# Patient Record
Sex: FEMALE | Race: WHITE | NOT HISPANIC OR LATINO
[De-identification: names, ages, dates, MRNs, and addresses within clinical notes are randomized per-mention and may not be internally consistent; named-entity substitution may affect disease eponyms.]

---

## 2020-02-11 PROBLEM — Z00.00 ENCOUNTER FOR PREVENTIVE HEALTH EXAMINATION: Status: ACTIVE | Noted: 2020-02-11

## 2020-02-20 ENCOUNTER — APPOINTMENT (OUTPATIENT)
Dept: ENDOCRINOLOGY | Facility: CLINIC | Age: 40
End: 2020-02-20
Payer: COMMERCIAL

## 2020-02-20 VITALS
SYSTOLIC BLOOD PRESSURE: 122 MMHG | HEART RATE: 77 BPM | HEIGHT: 65 IN | BODY MASS INDEX: 30.99 KG/M2 | DIASTOLIC BLOOD PRESSURE: 74 MMHG | WEIGHT: 186 LBS

## 2020-02-20 DIAGNOSIS — Z86.59 PERSONAL HISTORY OF OTHER MENTAL AND BEHAVIORAL DISORDERS: ICD-10-CM

## 2020-02-20 DIAGNOSIS — Z78.9 OTHER SPECIFIED HEALTH STATUS: ICD-10-CM

## 2020-02-20 DIAGNOSIS — Z60.2 PROBLEMS RELATED TO LIVING ALONE: ICD-10-CM

## 2020-02-20 DIAGNOSIS — Z85.828 PERSONAL HISTORY OF OTHER MALIGNANT NEOPLASM OF SKIN: ICD-10-CM

## 2020-02-20 DIAGNOSIS — Z87.891 PERSONAL HISTORY OF NICOTINE DEPENDENCE: ICD-10-CM

## 2020-02-20 PROCEDURE — 99204 OFFICE O/P NEW MOD 45 MIN: CPT

## 2020-02-20 PROCEDURE — 99024 POSTOP FOLLOW-UP VISIT: CPT

## 2020-02-20 SDOH — SOCIAL STABILITY - SOCIAL INSECURITY: PROBLEMS RELATED TO LIVING ALONE: Z60.2

## 2020-02-20 NOTE — PHYSICAL EXAM
[Alert] : alert [No Acute Distress] : no acute distress [Well Nourished] : well nourished [Well Developed] : well developed [Normal Sclera/Conjunctiva] : normal sclera/conjunctiva [EOMI] : extra ocular movement intact [No LAD] : no lymphadenopathy [Thyroid Not Enlarged] : the thyroid was not enlarged [No Thyroid Nodules] : there were no palpable thyroid nodules [Normal Rate and Effort] : normal respiratory rhythm and effort [Clear to Auscultation] : lungs were clear to auscultation bilaterally [Normal Rate] : heart rate was normal  [Normal S1, S2] : normal S1 and S2 [Cranial Nerves Intact] : cranial nerves 2-12 were intact [Normal Reflexes] : deep tendon reflexes were 2+ and symmetric [No Tremors] : no tremors [Oriented x3] : oriented to person, place, and time [Normal Insight/Judgement] : insight and judgment were intact

## 2020-02-20 NOTE — ASSESSMENT
[FreeTextEntry1] : 40 year old female with \par 1. Bilateral thyroid nodule - with Left 7 mm nodule with irregular borders which meets criteria for FNA biopsy\par - discussed risks of thyroid nodule\par - advised FNA Left 7 mm nodule\par \par 2. Midlly elevated TSH on labs 1/2020 with vague symptoms - minimal improvement since starting low dose thyroid hormone. She does not have autoimmune  thyroid disease\par - no indication to continue thyroid hormone at this time\par - stop low dose LT4 and repeat TFTs 6 weeks\par - discussed signs/sx and diagnosis of hypothyroidism\par \par 3. IFG on labs - repeat Fasting glucose and A1c level

## 2020-02-20 NOTE — DATA REVIEWED
[FreeTextEntry1] : Labs 1/29/20 neg TPO and Tg Ab\par Labs 1/3/20 Gluc 106, TSH 5.46, FT4 1.11\par \par Thyroid sono 1/30/20\par RLP nodule 3x2x3 mm ?cystic nodule\par LMP nodule 7x5x6 mm - hypoechoic, irregular borders

## 2020-02-20 NOTE — HISTORY OF PRESENT ILLNESS
[FreeTextEntry1] : Quality: abnormal TSH and thyroid nodules\par Duration: 2020\par Onset: routine blood test showed TSH mild elevation\par Severity: mild\par Associated symptoms: denies anterior neck pain, dysphagia or voice changes, (+) mood swings, depression and anxiety\par \par MODIFYING FACTORS: \par Medication history - no prior thyroid meds\par Current thyroid medication:\par Levothyroxine 50 mcg daily - started 3 weeks ago, adherent with dosing and admin\par \par \par \par \par

## 2020-02-20 NOTE — CONSULT LETTER
[Dear  ___] : Dear  [unfilled], [Consult Letter:] : I had the pleasure of evaluating your patient, [unfilled]. [Please see my note below.] : Please see my note below. [Consult Closing:] : Thank you very much for allowing me to participate in the care of this patient.  If you have any questions, please do not hesitate to contact me. [Sincerely,] : Sincerely, [FreeTextEntry3] : Jordyn Hughes, \par

## 2020-02-20 NOTE — REVIEW OF SYSTEMS
[Fatigue] : fatigue [Recent Weight Gain (___ Lbs)] : recent [unfilled] ~Ulb weight gain [As Noted in HPI] : as noted in HPI [Dry Skin] : dry skin [Cough] : cough [Anxiety] : anxiety [Depression] : depression [Negative] : Neurological [Shortness Of Breath] : no shortness of breath [SOB on Exertion] : no shortness of breath during exertion

## 2020-04-08 ENCOUNTER — APPOINTMENT (OUTPATIENT)
Dept: ENDOCRINOLOGY | Facility: CLINIC | Age: 40
End: 2020-04-08

## 2020-06-25 ENCOUNTER — APPOINTMENT (OUTPATIENT)
Dept: ENDOCRINOLOGY | Facility: CLINIC | Age: 40
End: 2020-06-25
Payer: COMMERCIAL

## 2020-06-25 DIAGNOSIS — R73.01 IMPAIRED FASTING GLUCOSE: ICD-10-CM

## 2020-06-25 DIAGNOSIS — R94.6 ABNORMAL RESULTS OF THYROID FUNCTION STUDIES: ICD-10-CM

## 2020-06-25 DIAGNOSIS — E04.2 NONTOXIC MULTINODULAR GOITER: ICD-10-CM

## 2020-06-25 PROCEDURE — 99213 OFFICE O/P EST LOW 20 MIN: CPT | Mod: 95

## 2020-06-25 RX ORDER — CLONAZEPAM 0.5 MG/1
0.5 TABLET ORAL
Refills: 0 | Status: DISCONTINUED | COMMUNITY
End: 2020-06-25

## 2020-06-25 RX ORDER — LEVOTHYROXINE SODIUM 0.05 MG/1
50 TABLET ORAL DAILY
Refills: 0 | Status: DISCONTINUED | COMMUNITY
End: 2020-06-25

## 2020-06-25 RX ORDER — SERTRALINE HYDROCHLORIDE 100 MG/1
100 TABLET, FILM COATED ORAL DAILY
Refills: 0 | Status: ACTIVE | COMMUNITY

## 2020-06-26 NOTE — REASON FOR VISIT
[Follow - Up] : a follow-up visit [Hypothyroidism] : hypothyroidism [Thyroid nodule/ MNG] : thyroid nodule/ MNG [Home] : at home, [unfilled] , at the time of the visit. [Medical Office: ([facility])___] : at the medical office located in  [Verbal consent obtained from patient] : the patient, [unfilled]

## 2020-06-26 NOTE — DATA REVIEWED
[FreeTextEntry1] : Labs 1/29/20 neg TPO and Tg Ab\par Labs 1/3/20 Gluc 106, TSH 5.46, FT4 1.11\par \par Thyroid sono 1/30/20\par RLP nodule 3x2x3 mm ?cystic nodule\par LMP nodule 7x5x6 mm - hypoechoic, irregular borders\par \par Thyroid FNA biopsy 3/6/20\par Left MP nodule - benign follicular nodule, cat 2

## 2020-06-26 NOTE — HISTORY OF PRESENT ILLNESS
[FreeTextEntry1] : Interval Hx: no changes, elevated stress and altered mood levels since pandemic, has been talking w therapist\par \par Quality: abnormal TSH and thyroid nodules\par Duration: 2020\par Onset: routine blood test showed TSH mild elevation\par Severity: mild\par Associated symptoms: denies anterior neck pain, dysphagia or voice changes\par \par MODIFYING FACTORS:  Current thyroid medication: off Lt4 since last visit\par \par \par \par \par \par

## 2020-06-26 NOTE — REVIEW OF SYSTEMS
[As Noted in HPI] : as noted in HPI [Chest Pain] : no chest pain [Recent Weight Loss (___ Lbs)] : no recent weight loss [Recent Weight Gain (___ Lbs)] : no recent weight gain [Shortness Of Breath] : no shortness of breath

## 2020-06-26 NOTE — ASSESSMENT
[FreeTextEntry1] : 40 year old female with \par 1. Bilateral thyroid nodule - with Left 7 mm nodule with irregular borders - benign FNA 3/2020\par - monitor nodules with yearly sono, due 3/2020\par \par 2. Midlly elevated TSH on labs 1/2020 with vague symptoms - She does not have autoimmune  thyroid disease\par - no indication to resume  thyroid hormone at this time\par \par 3. IFG on prior labs - repeat Fasting glucose and A1c level are normal.

## 2020-06-26 NOTE — PHYSICAL EXAM
[Alert] : alert [EOMI] : extra ocular movement intact [No Acute Distress] : no acute distress [Well Nourished] : well nourished [No Respiratory Distress] : no respiratory distress [Oriented x3] : oriented to person, place, and time [No Accessory Muscle Use] : no accessory muscle use [Normal Mood] : the mood was normal [Normal Affect] : the affect was normal [Normal Insight/Judgement] : insight and judgment were intact [de-identified] : No visible thyroid enlargement

## 2020-08-18 ENCOUNTER — APPOINTMENT (OUTPATIENT)
Dept: ENDOCRINOLOGY | Facility: CLINIC | Age: 40
End: 2020-08-18

## 2022-09-16 ENCOUNTER — APPOINTMENT (OUTPATIENT)
Dept: HUMAN REPRODUCTION | Facility: CLINIC | Age: 42
End: 2022-09-16

## 2023-04-28 ENCOUNTER — OFFICE (OUTPATIENT)
Dept: URBAN - METROPOLITAN AREA CLINIC 100 | Facility: CLINIC | Age: 43
Setting detail: OPHTHALMOLOGY
End: 2023-04-28
Payer: COMMERCIAL

## 2023-04-28 VITALS — HEIGHT: 55 IN

## 2023-04-28 DIAGNOSIS — H50.011: ICD-10-CM

## 2023-04-28 DIAGNOSIS — H52.13: ICD-10-CM

## 2023-04-28 PROCEDURE — 92060 SENSORIMOTOR EXAMINATION: CPT | Performed by: OPHTHALMOLOGY

## 2023-04-28 PROCEDURE — 99214 OFFICE O/P EST MOD 30 MIN: CPT | Performed by: OPHTHALMOLOGY

## 2023-04-28 PROCEDURE — 92015 DETERMINE REFRACTIVE STATE: CPT | Performed by: OPHTHALMOLOGY

## 2023-04-28 ASSESSMENT — SPHEQUIV_DERIVED
OD_SPHEQUIV: -2.625
OS_SPHEQUIV: -2
OS_SPHEQUIV: -2.125
OS_SPHEQUIV: -2
OD_SPHEQUIV: -2.125
OD_SPHEQUIV: -2.125
OS_SPHEQUIV: -1.75
OD_SPHEQUIV: -2

## 2023-04-28 ASSESSMENT — CONFRONTATIONAL VISUAL FIELD TEST (CVF)
OS_FINDINGS: FULL
OD_FINDINGS: FULL

## 2023-04-28 ASSESSMENT — AXIALLENGTH_DERIVED
OS_AL: 23.9579
OD_AL: 24.266
OD_AL: 24.0617
OS_AL: 24.0588
OD_AL: 24.0111
OS_AL: 24.0588
OS_AL: 24.1095
OD_AL: 24.0617

## 2023-04-28 ASSESSMENT — REFRACTION_CURRENTRX
OS_VPRISM_DIRECTION: SV
OD_AXIS: 095
OD_OVR_VA: 20/
OS_SPHERE: -1.50
OS_AXIS: 097
OS_OVR_VA: 20/
OS_CYLINDER: -0.50
OD_VPRISM_DIRECTION: SV
OD_CYLINDER: -0.50
OD_SPHERE: -1.75

## 2023-04-28 ASSESSMENT — REFRACTION_MANIFEST
OS_SPHERE: -1.75
OD_VA1: 20/20-1
OD_AXIS: 170
OS_VA1: 20/20
OD_SPHERE: -1.75
OS_AXIS: 90
OD_CYLINDER: -0.75
OD_SPHERE: -2.75
OS_VA1: 20/20
OD_CYLINDER: +0.25
OS_CYLINDER: -0.50
OD_AXIS: 100
OS_SPHERE: -2.25
OS_CYLINDER: +0.25
OS_AXIS: 140
OD_VA1: 20/20-1

## 2023-04-28 ASSESSMENT — REFRACTION_AUTOREFRACTION
OD_SPHERE: -1.75
OD_CYLINDER: -0.75
OD_CYLINDER: +0.50
OS_CYLINDER: -0.50
OS_SPHERE: -1.75
OS_SPHERE: -2.00
OS_AXIS: 090
OD_SPHERE: -2.25
OS_AXIS: 155
OD_AXIS: 100
OD_AXIS: 180
OS_CYLINDER: +0.50

## 2023-04-28 ASSESSMENT — VISUAL ACUITY
OD_BCVA: 20/25-1
OS_BCVA: 20/20-2

## 2023-04-28 ASSESSMENT — KERATOMETRY
OD_AXISANGLE_DEGREES: 090
OD_K1POWER_DIOPTERS: 44.50
OS_AXISANGLE_DEGREES: 105
OD_K2POWER_DIOPTERS: 44.50
OS_K2POWER_DIOPTERS: 44.50
OS_K1POWER_DIOPTERS: 44.25

## 2023-04-28 ASSESSMENT — TONOMETRY
OS_IOP_MMHG: 18
OD_IOP_MMHG: 17

## 2023-06-09 ENCOUNTER — OFFICE (OUTPATIENT)
Dept: URBAN - METROPOLITAN AREA CLINIC 100 | Facility: CLINIC | Age: 43
Setting detail: OPHTHALMOLOGY
End: 2023-06-09
Payer: COMMERCIAL

## 2023-06-09 DIAGNOSIS — H50.011: ICD-10-CM

## 2023-06-09 PROCEDURE — 99214 OFFICE O/P EST MOD 30 MIN: CPT | Performed by: OPHTHALMOLOGY

## 2023-06-09 PROCEDURE — 92060 SENSORIMOTOR EXAMINATION: CPT | Performed by: OPHTHALMOLOGY

## 2023-06-09 ASSESSMENT — SPHEQUIV_DERIVED
OS_SPHEQUIV: -2.125
OS_SPHEQUIV: -2
OD_SPHEQUIV: -2.625
OD_SPHEQUIV: -2.25
OS_SPHEQUIV: -2.125
OD_SPHEQUIV: -2.125
OD_SPHEQUIV: -2
OS_SPHEQUIV: -1.75

## 2023-06-09 ASSESSMENT — CONFRONTATIONAL VISUAL FIELD TEST (CVF)
OS_FINDINGS: FULL
OD_FINDINGS: FULL

## 2023-06-09 ASSESSMENT — REFRACTION_CURRENTRX
OD_SPHERE: -1.75
OD_VPRISM_DIRECTION: SV
OS_CYLINDER: -0.50
OS_VPRISM_DIRECTION: SV
OD_AXIS: 088
OS_OVR_VA: 20/
OS_SPHERE: -1.50
OD_CYLINDER: -0.50
OD_OVR_VA: 20/
OS_AXIS: 089

## 2023-06-09 ASSESSMENT — REFRACTION_MANIFEST
OD_SPHERE: -1.75
OD_CYLINDER: +0.25
OS_AXIS: 140
OS_CYLINDER: -0.50
OS_SPHERE: -2.25
OS_AXIS: 90
OS_CYLINDER: +0.25
OD_CYLINDER: -0.75
OD_AXIS: 100
OD_VA1: 20/20-1
OD_AXIS: 170
OS_SPHERE: -1.75
OD_SPHERE: -2.75
OS_VA1: 20/20
OD_VA1: 20/20-1
OS_VA1: 20/20

## 2023-06-09 ASSESSMENT — REFRACTION_AUTOREFRACTION
OS_CYLINDER: +0.50
OD_CYLINDER: -0.50
OD_SPHERE: -2.00
OS_AXIS: 100
OD_CYLINDER: +0.50
OS_SPHERE: -2.00
OD_SPHERE: -2.25
OD_AXIS: 180
OS_CYLINDER: -0.25
OS_AXIS: 155
OD_AXIS: 092
OS_SPHERE: -2.00

## 2023-06-09 ASSESSMENT — VISUAL ACUITY
OD_BCVA: 20/20-3
OS_BCVA: 20/20-2

## 2023-06-09 ASSESSMENT — AXIALLENGTH_DERIVED
OD_AL: 23.9164
OS_AL: 24.0111
OD_AL: 23.9665
OS_AL: 24.0617
OS_AL: 23.9107
OS_AL: 24.0617
OD_AL: 24.1693
OD_AL: 24.0169

## 2023-06-09 ASSESSMENT — TONOMETRY
OS_IOP_MMHG: 17
OD_IOP_MMHG: 18

## 2023-06-09 ASSESSMENT — KERATOMETRY
OS_K1POWER_DIOPTERS: 44.25
OS_K2POWER_DIOPTERS: 44.75
OD_AXISANGLE_DEGREES: 090
OD_K2POWER_DIOPTERS: 44.75
OD_K1POWER_DIOPTERS: 44.75
OS_AXISANGLE_DEGREES: 090

## 2023-08-01 ENCOUNTER — OFFICE (OUTPATIENT)
Dept: URBAN - METROPOLITAN AREA CLINIC 100 | Facility: CLINIC | Age: 43
Setting detail: OPHTHALMOLOGY
End: 2023-08-01
Payer: COMMERCIAL

## 2023-08-01 DIAGNOSIS — H50.011: ICD-10-CM

## 2023-08-01 PROCEDURE — 92060 SENSORIMOTOR EXAMINATION: CPT | Performed by: OPHTHALMOLOGY

## 2023-08-01 PROCEDURE — 99214 OFFICE O/P EST MOD 30 MIN: CPT | Performed by: OPHTHALMOLOGY

## 2023-08-01 ASSESSMENT — REFRACTION_AUTOREFRACTION
OD_AXIS: 180
OS_CYLINDER: +0.50
OD_CYLINDER: +0.50
OS_CYLINDER: -0.25
OS_AXIS: 155
OD_AXIS: 096
OD_CYLINDER: -0.75
OS_SPHERE: -2.00
OD_SPHERE: -2.00
OD_SPHERE: -2.25
OS_AXIS: 106
OS_SPHERE: -2.25

## 2023-08-01 ASSESSMENT — REFRACTION_MANIFEST
OS_SPHERE: -2.00
OS_VA1: 20/20-1
OD_SPHERE: -1.75
OU_VA: 20/20
OS_CYLINDER: SPHERE
OD_VA1: 20/20
OD_CYLINDER: -0.75
OD_AXIS: 100

## 2023-08-01 ASSESSMENT — REFRACTION_CURRENTRX
OD_SPHERE: -1.75
OD_OVR_VA: 20/
OS_CYLINDER: -0.50
OD_AXIS: 087
OD_CYLINDER: -0.50
OS_VPRISM_DIRECTION: SV
OS_AXIS: 089
OS_OVR_VA: 20/
OD_VPRISM_DIRECTION: SV
OS_SPHERE: -1.50

## 2023-08-01 ASSESSMENT — AXIALLENGTH_DERIVED
OD_AL: 23.9164
OD_AL: 24.0675
OD_AL: 23.9665
OS_AL: 24.1634
OS_AL: 23.9107

## 2023-08-01 ASSESSMENT — KERATOMETRY
OS_K1POWER_DIOPTERS: 44.25
OD_AXISANGLE_DEGREES: 090
OS_K2POWER_DIOPTERS: 44.75
OS_AXISANGLE_DEGREES: 086
OD_K1POWER_DIOPTERS: 44.75
OD_K2POWER_DIOPTERS: 44.75

## 2023-08-01 ASSESSMENT — CONFRONTATIONAL VISUAL FIELD TEST (CVF)
OS_FINDINGS: FULL
OD_FINDINGS: FULL

## 2023-08-01 ASSESSMENT — SPHEQUIV_DERIVED
OS_SPHEQUIV: -2.375
OS_SPHEQUIV: -1.75
OD_SPHEQUIV: -2
OD_SPHEQUIV: -2.375
OD_SPHEQUIV: -2.125

## 2023-08-01 ASSESSMENT — VISUAL ACUITY
OD_BCVA: 20/30
OS_BCVA: 20/20

## 2023-09-07 ENCOUNTER — OFFICE (OUTPATIENT)
Dept: URBAN - METROPOLITAN AREA CLINIC 6 | Facility: CLINIC | Age: 43
Setting detail: OPHTHALMOLOGY
End: 2023-09-07
Payer: COMMERCIAL

## 2023-09-07 DIAGNOSIS — H50.011: ICD-10-CM

## 2023-09-07 PROCEDURE — 99214 OFFICE O/P EST MOD 30 MIN: CPT | Performed by: OPHTHALMOLOGY

## 2023-09-07 PROCEDURE — 92060 SENSORIMOTOR EXAMINATION: CPT | Performed by: OPHTHALMOLOGY

## 2023-09-07 ASSESSMENT — KERATOMETRY
OS_K1POWER_DIOPTERS: 44.25
OD_AXISANGLE_DEGREES: 144
OD_K2POWER_DIOPTERS: 44.75
OS_AXISANGLE_DEGREES: 089
OD_K1POWER_DIOPTERS: 44.50
OS_K2POWER_DIOPTERS: 44.50

## 2023-09-07 ASSESSMENT — REFRACTION_MANIFEST
OD_VA1: 20/20
OD_AXIS: 100
OD_SPHERE: -1.75
OS_CYLINDER: SPHERE
OU_VA: 20/20
OD_CYLINDER: -0.75
OS_SPHERE: -2.00
OS_VA1: 20/20-1

## 2023-09-07 ASSESSMENT — REFRACTION_CURRENTRX
OS_CYLINDER: -0.50
OS_SPHERE: -1.50
OS_AXIS: 089
OD_OVR_VA: 20/
OD_VPRISM_DIRECTION: SV
OS_OVR_VA: 20/
OD_AXIS: 087
OD_SPHERE: -1.75
OD_CYLINDER: -0.50
OS_VPRISM_DIRECTION: SV

## 2023-09-07 ASSESSMENT — REFRACTION_AUTOREFRACTION
OS_SPHERE: -2.00
OD_AXIS: 180
OS_AXIS: 155
OD_AXIS: 090
OS_CYLINDER: +0.50
OD_SPHERE: -2.00
OD_SPHERE: -2.25
OS_CYLINDER: -0.50
OD_CYLINDER: +0.50
OD_CYLINDER: -0.75
OS_AXIS: 084
OS_SPHERE: -2.00

## 2023-09-07 ASSESSMENT — SPHEQUIV_DERIVED
OS_SPHEQUIV: -2.25
OD_SPHEQUIV: -2
OD_SPHEQUIV: -2.125
OD_SPHEQUIV: -2.375
OS_SPHEQUIV: -1.75

## 2023-09-07 ASSESSMENT — AXIALLENGTH_DERIVED
OS_AL: 24.1605
OD_AL: 24.1154
OS_AL: 23.9579
OD_AL: 24.014
OD_AL: 23.9637

## 2023-09-07 ASSESSMENT — TONOMETRY
OS_IOP_MMHG: 14
OD_IOP_MMHG: 15

## 2023-09-07 ASSESSMENT — CONFRONTATIONAL VISUAL FIELD TEST (CVF)
OS_FINDINGS: FULL
OD_FINDINGS: FULL

## 2023-09-07 ASSESSMENT — VISUAL ACUITY
OS_BCVA: 20/20
OD_BCVA: 20/20

## 2024-03-07 ENCOUNTER — OFFICE (OUTPATIENT)
Dept: URBAN - METROPOLITAN AREA CLINIC 6 | Facility: CLINIC | Age: 44
Setting detail: OPHTHALMOLOGY
End: 2024-03-07
Payer: COMMERCIAL

## 2024-03-07 DIAGNOSIS — H50.011: ICD-10-CM

## 2024-03-07 PROCEDURE — 92060 SENSORIMOTOR EXAMINATION: CPT | Performed by: OPHTHALMOLOGY

## 2024-03-07 PROCEDURE — 99214 OFFICE O/P EST MOD 30 MIN: CPT | Performed by: OPHTHALMOLOGY

## 2024-03-07 ASSESSMENT — REFRACTION_CURRENTRX
OS_SPHERE: -1.50
OD_AXIS: 087
OS_AXIS: 089
OD_OVR_VA: 20/
OS_OVR_VA: 20/
OD_CYLINDER: -0.50
OS_VPRISM_DIRECTION: SV
OD_VPRISM_DIRECTION: SV
OD_SPHERE: -1.75
OS_CYLINDER: -0.50

## 2024-03-07 ASSESSMENT — REFRACTION_MANIFEST
OS_SPHERE: -2.00
OU_VA: 20/20
OD_SPHERE: -1.75
OD_VA1: 20/20
OD_CYLINDER: -0.75
OS_VA1: 20/20-1
OS_CYLINDER: SPHERE
OD_AXIS: 100

## 2024-03-07 ASSESSMENT — SPHEQUIV_DERIVED: OD_SPHEQUIV: -2.125

## 2024-03-14 ENCOUNTER — TRANSCRIPTION ENCOUNTER (OUTPATIENT)
Age: 44
End: 2024-03-14

## 2024-03-15 ENCOUNTER — TRANSCRIPTION ENCOUNTER (OUTPATIENT)
Age: 44
End: 2024-03-15

## 2024-03-15 ENCOUNTER — OUTPATIENT HOSPITAL (OUTPATIENT)
Dept: URBAN - METROPOLITAN AREA CLINIC 33 | Facility: CLINIC | Age: 44
Setting detail: OPHTHALMOLOGY
End: 2024-03-15
Payer: COMMERCIAL

## 2024-03-15 ENCOUNTER — OUTPATIENT (OUTPATIENT)
Dept: OUTPATIENT SERVICES | Facility: HOSPITAL | Age: 44
LOS: 1 days | End: 2024-03-15
Payer: COMMERCIAL

## 2024-03-15 VITALS
SYSTOLIC BLOOD PRESSURE: 106 MMHG | RESPIRATION RATE: 12 BRPM | WEIGHT: 208.12 LBS | HEIGHT: 65 IN | OXYGEN SATURATION: 96 % | TEMPERATURE: 98 F | HEART RATE: 73 BPM | DIASTOLIC BLOOD PRESSURE: 68 MMHG

## 2024-03-15 VITALS
HEART RATE: 89 BPM | SYSTOLIC BLOOD PRESSURE: 125 MMHG | DIASTOLIC BLOOD PRESSURE: 78 MMHG | OXYGEN SATURATION: 99 % | RESPIRATION RATE: 12 BRPM

## 2024-03-15 DIAGNOSIS — H50.011 MONOCULAR ESOTROPIA, RIGHT EYE: ICD-10-CM

## 2024-03-15 DIAGNOSIS — Z98.890 OTHER SPECIFIED POSTPROCEDURAL STATES: Chronic | ICD-10-CM

## 2024-03-15 DIAGNOSIS — H50.011: ICD-10-CM

## 2024-03-15 LAB — HCG UR QL: NEGATIVE — SIGNIFICANT CHANGE UP

## 2024-03-15 PROCEDURE — 81025 URINE PREGNANCY TEST: CPT

## 2024-03-15 PROCEDURE — ZZZZZ: CPT

## 2024-03-15 PROCEDURE — 67311 REVISE EYE MUSCLE: CPT | Mod: 50

## 2024-03-15 PROCEDURE — 67311 REVISE EYE MUSCLE: CPT | Mod: 50 | Performed by: OPHTHALMOLOGY

## 2024-03-15 RX ORDER — SERTRALINE 25 MG/1
1 TABLET, FILM COATED ORAL
Refills: 0 | DISCHARGE

## 2024-03-15 RX ORDER — TRAZODONE HCL 50 MG
0 TABLET ORAL
Refills: 0 | DISCHARGE

## 2024-03-15 NOTE — ASU DISCHARGE PLAN (ADULT/PEDIATRIC) - NS MD DC FALL RISK RISK
For information on Fall & Injury Prevention, visit: [email or web address] OR  [email or web address] OR  https://www.cdc.gov/steadi/patient.html

## 2024-03-15 NOTE — ASU DISCHARGE PLAN (ADULT/PEDIATRIC) - CARE PROVIDER_API CALL
Ly Casarez  Ophthalmology  [address] [place] [address], [address]  [place] [address]  Phone: [phone]  Fax: [phone]  Scheduled Appointment: 03/16/2024 09:30 AM

## 2024-03-15 NOTE — ASU PATIENT PROFILE, ADULT - NSICDXPASTSURGICALHX_GEN_ALL_CORE_FT
PAST SURGICAL HISTORY:  H/O dilation and curettage x2    S/P Mohs surgery for basal cell carcinoma

## 2024-03-15 NOTE — ASU PATIENT PROFILE, ADULT - FALL HARM RISK - PT AGE POPULATION HIDDEN
Patient's preferred pharmacy has been set up and verified.  Per patient there were no changes to dose, sig, or quantity.  
Pt trans to appt desk.  
Refill request for zolpidem.  Last seen 4/23/18 to establish care;  Last filled 2/12/19.    PDMP reviewed and shows med was last dispensed on 2/12/19. No suspicious behavior found. Routed to pcp to advise please.      
Would rf x 1 only, needs appt  
Adult

## 2024-03-16 ENCOUNTER — OFFICE (OUTPATIENT)
Dept: URBAN - METROPOLITAN AREA CLINIC 6 | Facility: CLINIC | Age: 44
Setting detail: OPHTHALMOLOGY
End: 2024-03-16
Payer: COMMERCIAL

## 2024-03-16 DIAGNOSIS — H50.011: ICD-10-CM

## 2024-03-16 PROCEDURE — 99024 POSTOP FOLLOW-UP VISIT: CPT | Performed by: OPHTHALMOLOGY

## 2024-03-16 ASSESSMENT — SPHEQUIV_DERIVED: OD_SPHEQUIV: -2.125

## 2024-03-16 ASSESSMENT — REFRACTION_CURRENTRX
OD_SPHERE: -1.75
OS_OVR_VA: 20/
OD_VPRISM_DIRECTION: SV
OS_VPRISM_DIRECTION: SV
OD_AXIS: 087
OS_CYLINDER: -0.50
OD_CYLINDER: -0.50
OD_OVR_VA: 20/
OS_SPHERE: -1.50
OS_AXIS: 089

## 2024-03-16 ASSESSMENT — REFRACTION_MANIFEST
OD_VA1: 20/20
OD_CYLINDER: -0.75
OS_VA1: 20/20-1
OD_AXIS: 100
OS_SPHERE: -2.00
OS_CYLINDER: SPHERE
OD_SPHERE: -1.75
OU_VA: 20/20

## 2024-03-21 ENCOUNTER — RX ONLY (RX ONLY)
Age: 44
End: 2024-03-21

## 2024-03-21 ENCOUNTER — OFFICE (OUTPATIENT)
Dept: URBAN - METROPOLITAN AREA CLINIC 6 | Facility: CLINIC | Age: 44
Setting detail: OPHTHALMOLOGY
End: 2024-03-21
Payer: COMMERCIAL

## 2024-03-21 VITALS — HEIGHT: 55 IN

## 2024-03-21 DIAGNOSIS — H50.011: ICD-10-CM

## 2024-03-21 PROBLEM — G24.5 BLEPHAROSPASM ; BOTH EYES: Status: ACTIVE | Noted: 2024-03-21

## 2024-03-21 PROCEDURE — 99024 POSTOP FOLLOW-UP VISIT: CPT | Performed by: OPHTHALMOLOGY

## 2024-03-21 ASSESSMENT — REFRACTION_CURRENTRX
OD_CYLINDER: -0.75
OS_SPHERE: -2.00
OS_VPRISM_DIRECTION: SV
OS_OVR_VA: 20/
OD_OVR_VA: 20/
OD_SPHERE: -2.00
OS_CYLINDER: SPH
OD_AXIS: 105
OD_VPRISM_DIRECTION: SV

## 2024-03-21 ASSESSMENT — REFRACTION_MANIFEST
OD_VA1: 20/20
OU_VA: 20/20
OD_CYLINDER: -0.75
OS_SPHERE: -2.00
OD_AXIS: 100
OD_SPHERE: -1.75
OS_VA1: 20/20-1
OS_CYLINDER: SPHERE

## 2024-03-21 ASSESSMENT — SPHEQUIV_DERIVED: OD_SPHEQUIV: -2.125

## 2024-04-18 ENCOUNTER — OFFICE (OUTPATIENT)
Dept: URBAN - METROPOLITAN AREA CLINIC 6 | Facility: CLINIC | Age: 44
Setting detail: OPHTHALMOLOGY
End: 2024-04-18
Payer: COMMERCIAL

## 2024-04-18 DIAGNOSIS — H50.011: ICD-10-CM

## 2024-04-18 DIAGNOSIS — G24.5: ICD-10-CM

## 2024-04-18 PROCEDURE — 99024 POSTOP FOLLOW-UP VISIT: CPT | Performed by: OPHTHALMOLOGY

## 2024-09-19 ENCOUNTER — OFFICE (OUTPATIENT)
Dept: URBAN - METROPOLITAN AREA CLINIC 6 | Facility: CLINIC | Age: 44
Setting detail: OPHTHALMOLOGY
End: 2024-09-19
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
## 2024-09-19 PROCEDURE — NO SHOW FE NO SHOW FEE: Performed by: OPHTHALMOLOGY

## 2024-11-01 ENCOUNTER — OFFICE (OUTPATIENT)
Dept: URBAN - METROPOLITAN AREA CLINIC 100 | Facility: CLINIC | Age: 44
Setting detail: OPHTHALMOLOGY
End: 2024-11-01
Payer: COMMERCIAL

## 2024-11-01 DIAGNOSIS — H50.011: ICD-10-CM

## 2024-11-01 PROCEDURE — 92060 SENSORIMOTOR EXAMINATION: CPT | Performed by: OPHTHALMOLOGY

## 2024-11-01 PROCEDURE — 99213 OFFICE O/P EST LOW 20 MIN: CPT | Performed by: OPHTHALMOLOGY

## 2024-11-01 ASSESSMENT — REFRACTION_CURRENTRX
OS_SPHERE: -2.25
OS_OVR_VA: 20/
OD_AXIS: 101
OD_CYLINDER: -0.75
OD_OVR_VA: 20/
OS_CYLINDER: SPH
OD_VPRISM_DIRECTION: SV
OD_SPHERE: -1.75
OS_VPRISM_DIRECTION: SV

## 2024-11-01 ASSESSMENT — REFRACTION_MANIFEST
OS_CYLINDER: SPHERE
OD_CYLINDER: -0.75
OU_VA: 20/20
OD_SPHERE: -1.75
OD_VA1: 20/20
OS_SPHERE: -2.00
OD_AXIS: 100
OS_VA1: 20/20-1

## 2024-11-01 ASSESSMENT — REFRACTION_AUTOREFRACTION
OS_SPHERE: -2.50
OS_AXIS: 109
OD_AXIS: 180
OD_CYLINDER: +0.50
OS_CYLINDER: -0.25
OD_SPHERE: -2.25
OS_CYLINDER: +0.50
OD_AXIS: 085
OD_SPHERE: -2.25
OS_AXIS: 155
OS_SPHERE: -2.00
OD_CYLINDER: -0.50

## 2024-11-01 ASSESSMENT — VISUAL ACUITY
OD_BCVA: 20/25-2
OS_BCVA: 20/20-1

## 2024-11-01 ASSESSMENT — TONOMETRY
OS_IOP_MMHG: 16
OD_IOP_MMHG: 16

## 2024-11-01 ASSESSMENT — KERATOMETRY
OS_AXISANGLE_DEGREES: 090
OS_K2POWER_DIOPTERS: 44.50
OD_K2POWER_DIOPTERS: 45.00
OD_AXISANGLE_DEGREES: 138
OD_K1POWER_DIOPTERS: 44.50
OS_K1POWER_DIOPTERS: 44.25

## 2024-11-01 ASSESSMENT — CONFRONTATIONAL VISUAL FIELD TEST (CVF)
OS_FINDINGS: FULL
OD_FINDINGS: FULL

## 2025-03-11 ENCOUNTER — TRANSCRIPTION ENCOUNTER (OUTPATIENT)
Age: 45
End: 2025-03-11

## 2025-06-18 ENCOUNTER — OFFICE (OUTPATIENT)
Dept: URBAN - METROPOLITAN AREA CLINIC 100 | Facility: CLINIC | Age: 45
Setting detail: OPHTHALMOLOGY
End: 2025-06-18
Payer: COMMERCIAL

## 2025-06-18 VITALS — HEIGHT: 55 IN

## 2025-06-18 DIAGNOSIS — H50.011: ICD-10-CM

## 2025-06-18 PROCEDURE — 92060 SENSORIMOTOR EXAMINATION: CPT | Performed by: OPHTHALMOLOGY

## 2025-06-18 PROCEDURE — 99213 OFFICE O/P EST LOW 20 MIN: CPT | Performed by: OPHTHALMOLOGY

## 2025-06-18 ASSESSMENT — REFRACTION_MANIFEST
OD_VA1: 20/20
OU_VA: 20/20
OD_AXIS: 100
OS_CYLINDER: SPHERE
OD_CYLINDER: -0.75
OS_VA1: 20/20-1
OS_SPHERE: -2.00
OD_SPHERE: -1.75

## 2025-06-18 ASSESSMENT — KERATOMETRY
OS_AXISANGLE_DEGREES: 095
OD_AXISANGLE_DEGREES: 125
OS_K1POWER_DIOPTERS: 44.25
OD_K1POWER_DIOPTERS: 44.50
METHOD_AUTO_MANUAL: AUTO
OD_K2POWER_DIOPTERS: 44.75
OS_K2POWER_DIOPTERS: 44.75

## 2025-06-18 ASSESSMENT — REFRACTION_CURRENTRX
OS_SPHERE: -2.00
OS_OVR_VA: 20/
OD_SPHERE: -2.00
OD_CYLINDER: -0.75
OS_VPRISM_DIRECTION: SV
OS_CYLINDER: SPH
OD_AXIS: 098
OD_VPRISM_DIRECTION: SV
OD_OVR_VA: 20/

## 2025-06-18 ASSESSMENT — VISUAL ACUITY
OD_BCVA: 20/30+2
OS_BCVA: 20/20-2

## 2025-06-18 ASSESSMENT — REFRACTION_AUTOREFRACTION
OS_CYLINDER: +0.50
OD_AXIS: 079
OS_SPHERE: -2.25
OS_CYLINDER: -0.25
OD_SPHERE: -2.25
OD_CYLINDER: -0.50
OD_CYLINDER: +0.50
OD_SPHERE: -2.25
OS_SPHERE: -2.00
OS_AXIS: 155
OD_AXIS: 180
OS_AXIS: 090

## 2025-06-18 ASSESSMENT — CONFRONTATIONAL VISUAL FIELD TEST (CVF)
OS_FINDINGS: FULL
OD_FINDINGS: FULL

## 2025-06-18 ASSESSMENT — TONOMETRY
OS_IOP_MMHG: 16
OD_IOP_MMHG: 14

## (undated) DEVICE — PACK OPTH STRAB

## (undated) DEVICE — SYR TB 1CC 27G X 0.5 (GREY)

## (undated) DEVICE — DRSG EYE PAD OVAL STERILE

## (undated) DEVICE — SYR LUER LOK 3CC

## (undated) DEVICE — WARMING BLANKET LOWER ADULT

## (undated) DEVICE — SUT VICRYL 8-0 12" TG140-8 DA

## (undated) DEVICE — APPLICATOR COTTON TIP 3" STERILE

## (undated) DEVICE — DRSG CURITY GAUZE SPONGE 4 X 4" 12-PLY

## (undated) DEVICE — ELCTR BVI ACCU-TEMP CAUTERY SHAFT FINE TIP 1/2"

## (undated) DEVICE — SUT VICRYL 6-0 18" TG100-8 DA

## (undated) DEVICE — SOL BALANCE SALT 15ML

## (undated) DEVICE — DRSG STERISTRIPS 0.5 X 4"

## (undated) DEVICE — DRAPE 1/2 SHEET 40X57"

## (undated) DEVICE — VENODYNE/SCD SLEEVE CALF MEDIUM

## (undated) DEVICE — CAUT SURG OPTH BATTERY OP LO/FN

## (undated) DEVICE — GLV 6 PROTEXIS (WHITE)